# Patient Record
Sex: FEMALE | Race: WHITE | URBAN - METROPOLITAN AREA
[De-identification: names, ages, dates, MRNs, and addresses within clinical notes are randomized per-mention and may not be internally consistent; named-entity substitution may affect disease eponyms.]

---

## 2023-01-06 ENCOUNTER — OFFICE VISIT (OUTPATIENT)
Dept: URBAN - METROPOLITAN AREA CLINIC 13 | Facility: CLINIC | Age: 73
End: 2023-01-06
Payer: MEDICARE

## 2023-01-06 DIAGNOSIS — H35.81 RETINAL EDEMA: Primary | ICD-10-CM

## 2023-01-06 DIAGNOSIS — H43.812 VITREOUS DEGENERATION, LEFT EYE: ICD-10-CM

## 2023-01-06 PROCEDURE — 99204 OFFICE O/P NEW MOD 45 MIN: CPT | Performed by: OPHTHALMOLOGY

## 2023-01-06 PROCEDURE — 92134 CPTRZ OPH DX IMG PST SGM RTA: CPT | Performed by: OPHTHALMOLOGY

## 2023-01-06 ASSESSMENT — INTRAOCULAR PRESSURE
OD: 17
OS: 13

## 2023-01-06 NOTE — IMPRESSION/PLAN
Impression: Retinal edema: H35.81 Right. Plan: She is s/p IOL exchange 12/20/22 and has mild macular edema OD. OCT shows mild edema OD and no IRF/SRF OS. She will continue PF/ketorolac QID OD and cosopt and alphagan as well. roz Alejo RTC 4-6 weeks OCT OU Prior notes from other provider(s) have been reviewed in conjunction with today's visit.

## 2023-02-03 ENCOUNTER — OFFICE VISIT (OUTPATIENT)
Dept: URBAN - METROPOLITAN AREA CLINIC 13 | Facility: CLINIC | Age: 73
End: 2023-02-03
Payer: MEDICARE

## 2023-02-03 DIAGNOSIS — H35.81 RETINAL EDEMA: Primary | ICD-10-CM

## 2023-02-03 DIAGNOSIS — H43.812 VITREOUS DEGENERATION, LEFT EYE: ICD-10-CM

## 2023-02-03 PROCEDURE — 92134 CPTRZ OPH DX IMG PST SGM RTA: CPT | Performed by: OPHTHALMOLOGY

## 2023-02-03 PROCEDURE — 99213 OFFICE O/P EST LOW 20 MIN: CPT | Performed by: OPHTHALMOLOGY

## 2023-02-03 ASSESSMENT — INTRAOCULAR PRESSURE
OS: 14
OD: 16

## 2023-02-03 NOTE — IMPRESSION/PLAN
Impression: Retinal edema: H35.81 Right. Plan: She is s/p IOL exchange 12/20/22 and has resolved macular edema OD on topical therapy. OCT shows no edema OD and no IRF/SRF OS. She will taper off PF/ketorolac QID OD and continue cosopt and alphagan as well. She requests an IOP check before returning to [place]. She will see you this summer. thanks Lenin Alejo RTC 2 weeks IOP check; if ok RTC PRN

## 2023-03-31 ENCOUNTER — OFFICE VISIT (OUTPATIENT)
Dept: URBAN - METROPOLITAN AREA CLINIC 13 | Facility: CLINIC | Age: 73
End: 2023-03-31
Payer: MEDICARE

## 2023-03-31 DIAGNOSIS — H35.81 RETINAL EDEMA: Primary | ICD-10-CM

## 2023-03-31 DIAGNOSIS — H43.812 VITREOUS DEGENERATION, LEFT EYE: ICD-10-CM

## 2023-03-31 PROCEDURE — 99213 OFFICE O/P EST LOW 20 MIN: CPT | Performed by: OPHTHALMOLOGY

## 2023-03-31 PROCEDURE — 92134 CPTRZ OPH DX IMG PST SGM RTA: CPT | Performed by: OPHTHALMOLOGY

## 2023-03-31 ASSESSMENT — INTRAOCULAR PRESSURE
OD: 24
OS: 10

## 2023-03-31 NOTE — IMPRESSION/PLAN
Impression: Retinal edema: H35.81 Right. Plan: She is s/p IOL exchange 12/20/22 and has resolved macular edema OD on topical therapy. OCT shows no IRF/SRF OU. She will taper off PF/ketorolac OD and continue cosopt and increase alphagan TID. She will see you next month
thanks Yvette Osborne RTC PRN